# Patient Record
Sex: FEMALE | Race: WHITE | Employment: FULL TIME | URBAN - METROPOLITAN AREA
[De-identification: names, ages, dates, MRNs, and addresses within clinical notes are randomized per-mention and may not be internally consistent; named-entity substitution may affect disease eponyms.]

---

## 2018-12-31 LAB
ABO, EXTERNAL RESULT: NORMAL
C. TRACHOMATIS, EXTERNAL RESULT: NEGATIVE
HEP B, EXTERNAL RESULT: NONREACTIVE
HIV, EXTERNAL RESULT: NONREACTIVE
N. GONORRHOEAE, EXTERNAL RESULT: NEGATIVE
RHOGAM, EXTERNAL RESULT: NORMAL
RHOGAM, EXTERNAL RESULT: POSITIVE
RPR, EXTERNAL RESULT: NEGATIVE
RUBELLA TITER, EXTERNAL RESULT: NORMAL

## 2019-05-30 ENCOUNTER — TELEPHONE (OUTPATIENT)
Dept: OBGYN CLINIC | Age: 34
End: 2019-05-30

## 2019-05-30 NOTE — TELEPHONE ENCOUNTER
Patient requests becoming a np at [facility]. Patient is new to the area, moved here from [facility].  Patient is 32 wks, CHERRY 07/21/19, lmp 10/14/18  Please notify the patient

## 2019-05-31 NOTE — TELEPHONE ENCOUNTER
Called and got VM. Left message we would like to speak w her and find out about risks and care. Advised her to call back, that we were gone this afternoon and were also seeing patients so may play phone tag.  Casi Mckeon

## 2019-06-13 ENCOUNTER — INITIAL PRENATAL (OUTPATIENT)
Dept: OBGYN CLINIC | Age: 34
End: 2019-06-13
Payer: COMMERCIAL

## 2019-06-13 VITALS
HEART RATE: 102 BPM | SYSTOLIC BLOOD PRESSURE: 132 MMHG | DIASTOLIC BLOOD PRESSURE: 89 MMHG | BODY MASS INDEX: 39.27 KG/M2 | HEIGHT: 64 IN | WEIGHT: 230 LBS

## 2019-06-13 DIAGNOSIS — O99.810 ABNORMAL GLUCOSE AFFECTING PREGNANCY: ICD-10-CM

## 2019-06-13 DIAGNOSIS — O09.90 HIGH RISK PREGNANCY, ANTEPARTUM: Primary | ICD-10-CM

## 2019-06-13 DIAGNOSIS — Z23 NEED FOR TDAP VACCINATION: ICD-10-CM

## 2019-06-13 DIAGNOSIS — B00.9 HSV-1 (HERPES SIMPLEX VIRUS 1) INFECTION: ICD-10-CM

## 2019-06-13 DIAGNOSIS — Z3A.34 34 WEEKS GESTATION OF PREGNANCY: ICD-10-CM

## 2019-06-13 DIAGNOSIS — O09.519 ADVANCED MATERNAL AGE, PRIMIGRAVIDA, ANTEPARTUM: ICD-10-CM

## 2019-06-13 PROCEDURE — 0500F INITIAL PRENATAL CARE VISIT: CPT | Performed by: ADVANCED PRACTICE MIDWIFE

## 2019-06-13 PROCEDURE — 90715 TDAP VACCINE 7 YRS/> IM: CPT | Performed by: ADVANCED PRACTICE MIDWIFE

## 2019-06-13 PROCEDURE — 90471 IMMUNIZATION ADMIN: CPT | Performed by: ADVANCED PRACTICE MIDWIFE

## 2019-06-13 NOTE — PROGRESS NOTES
INITIAL OBSTETRICAL VISIT EVALUATION:    Subjective:      Michael Infante is being seen today for her new obstetrical visit. The pregnancy is  a planned pregnancy. She is  accepting at this time. Her last period was Patient's last menstrual period was 10/14/2018. .  This was a sure and definite menses. She was not on OCP at conception. Patient reports no complaints. She denies  spotting, cramping or pain  Fetal Movement: normal.     SO/Partner:  [place]  Involved:  yes    Occupation:  Stay homes     Work/Environment hazards:  no  Pets:  no    Teratogen exposure since LMP: (OTC/prescription/ETOH/drugs):  zoloft 150mg    History of prior GBS-infected child:  no  Recent travel outside of country (partner also):  yes  Where:  [place] exposure (partner also): no    Arrives as 34 week transfer of care. Reports pregnancy has been uncomplicated. Looking for low intervention birth. Objective:        /89   Pulse 102   Ht 5' 4\" (1.626 m)   Wt 230 lb (104.3 kg)   LMP 10/14/2018     See OB physical as charted     Estimated gestational age is  Unknown    Mother's ethnicity:   Father's ethnicity:     BREECH by Kwadwo Billings (mother reports being ER PA and scanning at work and baby has been breech x1 month)        Assessment:      Pregnancy @ 29 and 4/7 weeks   Advanced Maternal Age  High risk pregnancy  Breech presentation      Plan:   The patient was seen and a full history was reviewed and completed. List was initiated. Role of ultrasound in pregnancy discussed; fetal survey: results reviewed    She was counseled on office policies. She was educated about the anticipated course of prenatal care. The patient was counseled on drug screening, HIV, Cystic Fibrosis, SMA and Sickle Cell disease, as appropriate. She verbally consented to HIV testing and drug screening. She declined/accepted CF/SMA testing. She was counseled on Toxoplasmosis/Listeriosis (cats/raw meat).     Pt is not a

## 2019-06-13 NOTE — PROGRESS NOTES
After obtaining consent, and per orders of YUKI [facility], JEOVANY, injection of tdap given in Left deltoid by Belkis Riggs. Patient instructed to remain in clinic for 20 minutes afterwards, and to report any adverse reaction to me immediately.

## 2019-06-21 ENCOUNTER — HOSPITAL ENCOUNTER (OUTPATIENT)
Age: 34
Discharge: HOME | End: 2019-06-21
Attending: OBSTETRICS & GYNECOLOGY | Admitting: OBSTETRICS & GYNECOLOGY
Payer: COMMERCIAL

## 2019-06-21 ENCOUNTER — ROUTINE PRENATAL (OUTPATIENT)
Dept: OBGYN CLINIC | Age: 34
End: 2019-06-21

## 2019-06-21 VITALS
WEIGHT: 230.2 LBS | DIASTOLIC BLOOD PRESSURE: 96 MMHG | HEART RATE: 82 BPM | BODY MASS INDEX: 39.51 KG/M2 | SYSTOLIC BLOOD PRESSURE: 145 MMHG

## 2019-06-21 VITALS
RESPIRATION RATE: 18 BRPM | BODY MASS INDEX: 39.27 KG/M2 | WEIGHT: 230 LBS | SYSTOLIC BLOOD PRESSURE: 140 MMHG | HEART RATE: 92 BPM | TEMPERATURE: 96.4 F | DIASTOLIC BLOOD PRESSURE: 86 MMHG | HEIGHT: 64 IN

## 2019-06-21 DIAGNOSIS — Z3A.35 35 WEEKS GESTATION OF PREGNANCY: ICD-10-CM

## 2019-06-21 DIAGNOSIS — O09.90 HIGH RISK PREGNANCY, ANTEPARTUM: Primary | ICD-10-CM

## 2019-06-21 DIAGNOSIS — B00.9 HSV-1 (HERPES SIMPLEX VIRUS 1) INFECTION: Primary | ICD-10-CM

## 2019-06-21 DIAGNOSIS — O16.3 HYPERTENSION AFFECTING PREGNANCY IN THIRD TRIMESTER: ICD-10-CM

## 2019-06-21 PROBLEM — Z79.899 MEDICATION DOSE CHANGED: Status: ACTIVE | Noted: 2019-06-21

## 2019-06-21 PROBLEM — O14.90 PREECLAMPSIA: Status: ACTIVE | Noted: 2019-06-21

## 2019-06-21 PROBLEM — O43.109 PLACENTAL ABNORMALITY, ANTEPARTUM: Status: ACTIVE | Noted: 2019-06-21

## 2019-06-21 LAB
ABSOLUTE EOS #: 0.16 K/UL (ref 0–0.44)
ABSOLUTE IMMATURE GRANULOCYTE: 0.03 K/UL (ref 0–0.3)
ABSOLUTE LYMPH #: 1.59 K/UL (ref 1.1–3.7)
ABSOLUTE MONO #: 0.73 K/UL (ref 0.1–1.2)
ALBUMIN SERPL-MCNC: 3.2 G/DL (ref 3.5–5.2)
ALBUMIN/GLOBULIN RATIO: 1.1 (ref 1–2.5)
ALP BLD-CCNC: 125 U/L (ref 35–104)
ALT SERPL-CCNC: 14 U/L (ref 5–33)
ANION GAP SERPL CALCULATED.3IONS-SCNC: 14 MMOL/L (ref 9–17)
AST SERPL-CCNC: 21 U/L
BASOPHILS # BLD: 0 % (ref 0–2)
BASOPHILS ABSOLUTE: 0.04 K/UL (ref 0–0.2)
BILIRUB SERPL-MCNC: 0.16 MG/DL (ref 0.3–1.2)
BUN BLDV-MCNC: 8 MG/DL (ref 6–20)
BUN/CREAT BLD: ABNORMAL (ref 9–20)
CALCIUM SERPL-MCNC: 8.8 MG/DL (ref 8.6–10.4)
CHLORIDE BLD-SCNC: 102 MMOL/L (ref 98–107)
CO2: 19 MMOL/L (ref 20–31)
CREAT SERPL-MCNC: 0.44 MG/DL (ref 0.5–0.9)
CREATININE URINE: 185 MG/DL (ref 28–217)
DIFFERENTIAL TYPE: ABNORMAL
EOSINOPHILS RELATIVE PERCENT: 2 % (ref 1–4)
GFR AFRICAN AMERICAN: >60 ML/MIN
GFR NON-AFRICAN AMERICAN: >60 ML/MIN
GFR SERPL CREATININE-BSD FRML MDRD: ABNORMAL ML/MIN/{1.73_M2}
GFR SERPL CREATININE-BSD FRML MDRD: ABNORMAL ML/MIN/{1.73_M2}
GLUCOSE BLD-MCNC: 97 MG/DL (ref 70–99)
HCT VFR BLD CALC: 36.4 % (ref 36.3–47.1)
HEMOGLOBIN: 11.8 G/DL (ref 11.9–15.1)
IMMATURE GRANULOCYTES: 0 %
LACTATE DEHYDROGENASE: 135 U/L (ref 135–214)
LYMPHOCYTES # BLD: 16 % (ref 24–43)
MCH RBC QN AUTO: 29 PG (ref 25.2–33.5)
MCHC RBC AUTO-ENTMCNC: 32.4 G/DL (ref 28.4–34.8)
MCV RBC AUTO: 89.4 FL (ref 82.6–102.9)
MONOCYTES # BLD: 7 % (ref 3–12)
NRBC AUTOMATED: 0 PER 100 WBC
PDW BLD-RTO: 12.5 % (ref 11.8–14.4)
PLATELET # BLD: 239 K/UL (ref 138–453)
PLATELET ESTIMATE: ABNORMAL
PMV BLD AUTO: 10.7 FL (ref 8.1–13.5)
POTASSIUM SERPL-SCNC: 4.3 MMOL/L (ref 3.7–5.3)
RBC # BLD: 4.07 M/UL (ref 3.95–5.11)
RBC # BLD: ABNORMAL 10*6/UL
SEG NEUTROPHILS: 75 % (ref 36–65)
SEGMENTED NEUTROPHILS ABSOLUTE COUNT: 7.29 K/UL (ref 1.5–8.1)
SODIUM BLD-SCNC: 135 MMOL/L (ref 135–144)
TOTAL PROTEIN, URINE: 167 MG/DL
TOTAL PROTEIN: 6.2 G/DL (ref 6.4–8.3)
URIC ACID: 4.8 MG/DL (ref 2.4–5.7)
URINE TOTAL PROTEIN CREATININE RATIO: 0.9 (ref 0–0.2)
WBC # BLD: 9.8 K/UL (ref 3.5–11.3)
WBC # BLD: ABNORMAL 10*3/UL

## 2019-06-21 PROCEDURE — 80053 COMPREHEN METABOLIC PANEL: CPT

## 2019-06-21 PROCEDURE — 99234 HOSP IP/OBS SM DT SF/LOW 45: CPT | Performed by: ADVANCED PRACTICE MIDWIFE

## 2019-06-21 PROCEDURE — 87081 CULTURE SCREEN ONLY: CPT

## 2019-06-21 PROCEDURE — 85025 COMPLETE CBC W/AUTO DIFF WBC: CPT

## 2019-06-21 PROCEDURE — 82570 ASSAY OF URINE CREATININE: CPT

## 2019-06-21 PROCEDURE — 84550 ASSAY OF BLOOD/URIC ACID: CPT

## 2019-06-21 PROCEDURE — 84156 ASSAY OF PROTEIN URINE: CPT

## 2019-06-21 PROCEDURE — 0502F SUBSEQUENT PRENATAL CARE: CPT | Performed by: ADVANCED PRACTICE MIDWIFE

## 2019-06-21 PROCEDURE — 83615 LACTATE (LD) (LDH) ENZYME: CPT

## 2019-06-21 PROCEDURE — 96372 THER/PROPH/DIAG INJ SC/IM: CPT

## 2019-06-21 PROCEDURE — 76818 FETAL BIOPHYS PROFILE W/NST: CPT

## 2019-06-21 PROCEDURE — 76818 FETAL BIOPHYS PROFILE W/NST: CPT | Performed by: OBSTETRICS & GYNECOLOGY

## 2019-06-21 PROCEDURE — 6360000002 HC RX W HCPCS: Performed by: ADVANCED PRACTICE MIDWIFE

## 2019-06-21 PROCEDURE — 99213 OFFICE O/P EST LOW 20 MIN: CPT

## 2019-06-21 PROCEDURE — 86403 PARTICLE AGGLUT ANTBDY SCRN: CPT

## 2019-06-21 RX ORDER — BETAMETHASONE SODIUM PHOSPHATE AND BETAMETHASONE ACETATE 3; 3 MG/ML; MG/ML
12 INJECTION, SUSPENSION INTRA-ARTICULAR; INTRALESIONAL; INTRAMUSCULAR; SOFT TISSUE ONCE
Status: COMPLETED | OUTPATIENT
Start: 2019-06-21 | End: 2019-06-21

## 2019-06-21 RX ORDER — ACYCLOVIR 400 MG/1
400 TABLET ORAL 3 TIMES DAILY
Qty: 60 TABLET | Refills: 0 | Status: SHIPPED | OUTPATIENT
Start: 2019-06-21

## 2019-06-21 RX ADMIN — BETAMETHASONE SODIUM PHOSPHATE AND BETAMETHASONE ACETATE 12 MG: 3; 3 INJECTION, SUSPENSION INTRA-ARTICULAR; INTRALESIONAL; INTRAMUSCULAR at 12:17

## 2019-06-21 ASSESSMENT — ENCOUNTER SYMPTOMS
RESPIRATORY NEGATIVE: 1
ALLERGIC/IMMUNOLOGIC NEGATIVE: 1
EYES NEGATIVE: 1
GASTROINTESTINAL NEGATIVE: 1

## 2019-06-21 NOTE — FLOWSHEET NOTE
Pt presents from the office for proteinuria and hypertension. Denies LOF, vaginal bleeding. Reports + fetal movement. Reports feeling braxon-serna contractions. To triage 1, gowned and urine specimen obtained.

## 2019-06-21 NOTE — H&P
[address] and Delivery Triage Note   2019  10:42 AM      SUBJECTIVE:  CHIEF COMPLAINT:  Elevated Blood Pressure    HISTORY OF PRESENT ILLNESS:      Rosendo Arrieta is a 37 y.o. female   At [address] [place] [address] is a transfer patient. New to the area. She was seen in the office today and was noted to have an elevated /96. Currently she denies headache, blurred vision, RUQ pain, Nausea and vomiting. She states she did feel nauseous over the weekend but has since resolved. Denies chest pain and SOB. Feels occasional contractions that are not painful. Denies vaginal bleeding and loss of fluid. States baby is moving. She has been getting adequate PNC while in [place]. OBJECTIVE:  ESTIMATED DUE DATE:    Estimated Date of Delivery: 19  Patient's last menstrual period was 10/14/2018. C/w 20+4     PRENATAL CARE:  Complicated by:    Patient Active Problem List   Diagnosis    High risk pregnancy, antepartum    Abnormal glucose affecting pregnancy    Advanced maternal age, 1st pregnancy    HSV-1 (herpes simplex virus 1) infection    TDAP received 19         PAST MEDICAL HISTORY:     Past Medical History:   Diagnosis Date    Asthma     exercise induced    Herpes simplex virus (HSV) infection     questionable genital herpes in past, cold sores    Mental disorder     depression and anxiety       PAST OB HISTORY:  OB History    Para Term  AB Living   1 0 0 0 0 0   SAB TAB Ectopic Molar Multiple Live Births   0 0 0 0 0 0      # Outcome Date GA Lbr Magdi/2nd Weight Sex Delivery Anes PTL Lv   1 Current                PAST  SURGICAL HISTORY:   Past Surgical History:   Procedure Laterality Date    KNEE SURGERY      SHOULDER SURGERY         SOCIAL HISTORY:   reports that she has never smoked. She has never used smokeless tobacco. She reports that she drank alcohol. She reports that she does not use drugs.      MEDICATIONS:  Prior to Admission medications

## 2019-06-21 NOTE — PROGRESS NOTES
SUBJECTIVE:    Marilia Bull is here for her routine OB visit. Offers no complaints. Admits to anxiety with breech presentation. Trying all options. Denies any HAs, visual changes, malaise, etc  She reports  fetal movement. She denies  vaginal bleeding. She denies  leaking of fluid. She denies  vaginal discharge. She denies  uterine contraction activity. She denies  nausea and/or vomiting. She denies retaining fluid in her extremities. OBJECTIVE:   Last menstrual period 10/14/2018. Unengaged Breech per Leopold's    ASSESSMENT/PLAN:  IUP @ 35.5 weeks  Hypertension  Breech presentation    The problem list was reviewed and updated as needed. Marilia Bull will monitor for fetal movements daily    Discussed elevated BP with ensuing consequences. Higher risk given age. Reviewed BP from prior practice with steady rise noted. Currently asymptomatic. Verbalizes concern for  Section with breech. Questions version possibility. Yeni Berkowitz notified and patient sent to L&D     Marilia Bull was counseled regarding all of the above    Patient was seen with total faced to face time of 15 minutes. More than 50% of this visit was on counseling and education.

## 2019-06-22 ENCOUNTER — HOSPITAL ENCOUNTER (OUTPATIENT)
Dept: LABOR AND DELIVERY | Age: 34
Discharge: HOME | End: 2019-06-22
Attending: OBSTETRICS & GYNECOLOGY | Admitting: OBSTETRICS & GYNECOLOGY
Payer: COMMERCIAL

## 2019-06-22 VITALS
SYSTOLIC BLOOD PRESSURE: 140 MMHG | TEMPERATURE: 98.2 F | RESPIRATION RATE: 16 BRPM | HEART RATE: 102 BPM | DIASTOLIC BLOOD PRESSURE: 84 MMHG

## 2019-06-22 PROCEDURE — 6360000002 HC RX W HCPCS: Performed by: ADVANCED PRACTICE MIDWIFE

## 2019-06-22 PROCEDURE — 96372 THER/PROPH/DIAG INJ SC/IM: CPT

## 2019-06-22 RX ORDER — BETAMETHASONE SODIUM PHOSPHATE AND BETAMETHASONE ACETATE 3; 3 MG/ML; MG/ML
12 INJECTION, SUSPENSION INTRA-ARTICULAR; INTRALESIONAL; INTRAMUSCULAR; SOFT TISSUE ONCE
Status: COMPLETED | OUTPATIENT
Start: 2019-06-22 | End: 2019-06-22

## 2019-06-22 RX ADMIN — BETAMETHASONE SODIUM PHOSPHATE AND BETAMETHASONE ACETATE 12 MG: 3; 3 INJECTION, SUSPENSION INTRA-ARTICULAR; INTRALESIONAL; INTRAMUSCULAR at 12:21

## 2019-06-23 ENCOUNTER — APPOINTMENT (OUTPATIENT)
Dept: GENERAL RADIOLOGY | Age: 34
End: 2019-06-23
Payer: COMMERCIAL

## 2019-06-23 ENCOUNTER — ANESTHESIA EVENT (OUTPATIENT)
Dept: LABOR AND DELIVERY | Age: 34
End: 2019-06-23
Payer: COMMERCIAL

## 2019-06-23 ENCOUNTER — HOSPITAL ENCOUNTER (INPATIENT)
Age: 34
LOS: 4 days | Discharge: HOME | End: 2019-06-27
Attending: OBSTETRICS & GYNECOLOGY | Admitting: OBSTETRICS & GYNECOLOGY
Payer: COMMERCIAL

## 2019-06-23 ENCOUNTER — ANESTHESIA (OUTPATIENT)
Dept: LABOR AND DELIVERY | Age: 34
End: 2019-06-23
Payer: COMMERCIAL

## 2019-06-23 VITALS — SYSTOLIC BLOOD PRESSURE: 121 MMHG | DIASTOLIC BLOOD PRESSURE: 71 MMHG | OXYGEN SATURATION: 97 %

## 2019-06-23 PROBLEM — B95.1 POSITIVE GBS TEST: Status: ACTIVE | Noted: 2019-06-23

## 2019-06-23 PROBLEM — O14.93 PREECLAMPSIA, THIRD TRIMESTER: Status: ACTIVE | Noted: 2019-06-23

## 2019-06-23 PROBLEM — O14.13 PRE-ECLAMPSIA, SEVERE, ANTEPARTUM, THIRD TRIMESTER: Status: ACTIVE | Noted: 2019-06-23

## 2019-06-23 PROBLEM — O09.93 HIGH-RISK PREGNANCY IN THIRD TRIMESTER: Status: ACTIVE | Noted: 2019-06-23

## 2019-06-23 LAB
ABO/RH: NORMAL
ABSOLUTE EOS #: <0.03 K/UL (ref 0–0.44)
ABSOLUTE IMMATURE GRANULOCYTE: 0.2 K/UL (ref 0–0.3)
ABSOLUTE LYMPH #: 1.88 K/UL (ref 1.1–3.7)
ABSOLUTE MONO #: 1.38 K/UL (ref 0.1–1.2)
ALBUMIN SERPL-MCNC: 3.2 G/DL (ref 3.5–5.2)
ALBUMIN/GLOBULIN RATIO: 1.1 (ref 1–2.5)
ALP BLD-CCNC: 124 U/L (ref 35–104)
ALT SERPL-CCNC: 13 U/L (ref 5–33)
AMPHETAMINE SCREEN URINE: NEGATIVE
ANION GAP SERPL CALCULATED.3IONS-SCNC: 11 MMOL/L (ref 9–17)
ANTIBODY SCREEN: NEGATIVE
ARM BAND NUMBER: NORMAL
AST SERPL-CCNC: 22 U/L
BARBITURATE SCREEN URINE: NEGATIVE
BASOPHILS # BLD: 0 % (ref 0–2)
BASOPHILS ABSOLUTE: 0.03 K/UL (ref 0–0.2)
BENZODIAZEPINE SCREEN, URINE: NEGATIVE
BILIRUB SERPL-MCNC: <0.1 MG/DL (ref 0.3–1.2)
BUN BLDV-MCNC: 10 MG/DL (ref 6–20)
BUN/CREAT BLD: ABNORMAL (ref 9–20)
BUPRENORPHINE URINE: NORMAL
CALCIUM SERPL-MCNC: 9.6 MG/DL (ref 8.6–10.4)
CANNABINOID SCREEN URINE: NEGATIVE
CHLORIDE BLD-SCNC: 106 MMOL/L (ref 98–107)
CO2: 19 MMOL/L (ref 20–31)
COCAINE METABOLITE, URINE: NEGATIVE
CREAT SERPL-MCNC: 0.49 MG/DL (ref 0.5–0.9)
CREATININE URINE: 130.3 MG/DL (ref 28–217)
CULTURE: ABNORMAL
DIFFERENTIAL TYPE: ABNORMAL
EOSINOPHILS RELATIVE PERCENT: 0 % (ref 1–4)
EXPIRATION DATE: NORMAL
GFR AFRICAN AMERICAN: >60 ML/MIN
GFR NON-AFRICAN AMERICAN: >60 ML/MIN
GFR SERPL CREATININE-BSD FRML MDRD: ABNORMAL ML/MIN/{1.73_M2}
GFR SERPL CREATININE-BSD FRML MDRD: ABNORMAL ML/MIN/{1.73_M2}
GLUCOSE BLD-MCNC: 142 MG/DL (ref 70–99)
HCT VFR BLD CALC: 33.9 % (ref 36.3–47.1)
HEMOGLOBIN: 11 G/DL (ref 11.9–15.1)
IMMATURE GRANULOCYTES: 1 %
LYMPHOCYTES # BLD: 13 % (ref 24–43)
Lab: ABNORMAL
MCH RBC QN AUTO: 29.7 PG (ref 25.2–33.5)
MCHC RBC AUTO-ENTMCNC: 32.4 G/DL (ref 28.4–34.8)
MCV RBC AUTO: 91.6 FL (ref 82.6–102.9)
MDMA URINE: NORMAL
METHADONE SCREEN, URINE: NEGATIVE
METHAMPHETAMINE, URINE: NORMAL
MONOCYTES # BLD: 9 % (ref 3–12)
NRBC AUTOMATED: 0.1 PER 100 WBC
OPIATES, URINE: NEGATIVE
OXYCODONE SCREEN URINE: NEGATIVE
PDW BLD-RTO: 12.6 % (ref 11.8–14.4)
PHENCYCLIDINE, URINE: NEGATIVE
PLATELET # BLD: 221 K/UL (ref 138–453)
PLATELET ESTIMATE: ABNORMAL
PMV BLD AUTO: 10.6 FL (ref 8.1–13.5)
POTASSIUM SERPL-SCNC: 4.3 MMOL/L (ref 3.7–5.3)
PROPOXYPHENE, URINE: NORMAL
RBC # BLD: 3.7 M/UL (ref 3.95–5.11)
RBC # BLD: ABNORMAL 10*6/UL
SEG NEUTROPHILS: 77 % (ref 36–65)
SEGMENTED NEUTROPHILS ABSOLUTE COUNT: 11.51 K/UL (ref 1.5–8.1)
SODIUM BLD-SCNC: 136 MMOL/L (ref 135–144)
SPECIMEN DESCRIPTION: ABNORMAL
T. PALLIDUM, IGG: NONREACTIVE
TEST INFORMATION: NORMAL
TOTAL PROTEIN, URINE: 123 MG/DL
TOTAL PROTEIN: 6 G/DL (ref 6.4–8.3)
TRICYCLIC ANTIDEPRESSANTS, UR: NORMAL
URINE TOTAL PROTEIN CREATININE RATIO: 0.94 (ref 0–0.2)
WBC # BLD: 15 K/UL (ref 3.5–11.3)
WBC # BLD: ABNORMAL 10*3/UL

## 2019-06-23 PROCEDURE — 2580000003 HC RX 258: Performed by: ADVANCED PRACTICE MIDWIFE

## 2019-06-23 PROCEDURE — 1220000000 HC SEMI PRIVATE OB R&B

## 2019-06-23 PROCEDURE — 80053 COMPREHEN METABOLIC PANEL: CPT

## 2019-06-23 PROCEDURE — 2580000003 HC RX 258: Performed by: NURSE ANESTHETIST, CERTIFIED REGISTERED

## 2019-06-23 PROCEDURE — 86900 BLOOD TYPING SEROLOGIC ABO: CPT

## 2019-06-23 PROCEDURE — 82570 ASSAY OF URINE CREATININE: CPT

## 2019-06-23 PROCEDURE — 6360000002 HC RX W HCPCS: Performed by: ADVANCED PRACTICE MIDWIFE

## 2019-06-23 PROCEDURE — 6360000002 HC RX W HCPCS: Performed by: STUDENT IN AN ORGANIZED HEALTH CARE EDUCATION/TRAINING PROGRAM

## 2019-06-23 PROCEDURE — 6370000000 HC RX 637 (ALT 250 FOR IP): Performed by: STUDENT IN AN ORGANIZED HEALTH CARE EDUCATION/TRAINING PROGRAM

## 2019-06-23 PROCEDURE — 3609079900 HC CESAREAN SECTION: Performed by: OBSTETRICS & GYNECOLOGY

## 2019-06-23 PROCEDURE — 86780 TREPONEMA PALLIDUM: CPT

## 2019-06-23 PROCEDURE — 2500000003 HC RX 250 WO HCPCS: Performed by: NURSE ANESTHETIST, CERTIFIED REGISTERED

## 2019-06-23 PROCEDURE — 2580000003 HC RX 258: Performed by: STUDENT IN AN ORGANIZED HEALTH CARE EDUCATION/TRAINING PROGRAM

## 2019-06-23 PROCEDURE — 7100000000 HC PACU RECOVERY - FIRST 15 MIN: Performed by: OBSTETRICS & GYNECOLOGY

## 2019-06-23 PROCEDURE — 84156 ASSAY OF PROTEIN URINE: CPT

## 2019-06-23 PROCEDURE — 3700000001 HC ADD 15 MINUTES (ANESTHESIA): Performed by: OBSTETRICS & GYNECOLOGY

## 2019-06-23 PROCEDURE — 85025 COMPLETE CBC W/AUTO DIFF WBC: CPT

## 2019-06-23 PROCEDURE — 3700000000 HC ANESTHESIA ATTENDED CARE: Performed by: OBSTETRICS & GYNECOLOGY

## 2019-06-23 PROCEDURE — 96365 THER/PROPH/DIAG IV INF INIT: CPT

## 2019-06-23 PROCEDURE — 2709999900 HC NON-CHARGEABLE SUPPLY: Performed by: OBSTETRICS & GYNECOLOGY

## 2019-06-23 PROCEDURE — 86901 BLOOD TYPING SEROLOGIC RH(D): CPT

## 2019-06-23 PROCEDURE — 96366 THER/PROPH/DIAG IV INF ADDON: CPT

## 2019-06-23 PROCEDURE — 80307 DRUG TEST PRSMV CHEM ANLYZR: CPT

## 2019-06-23 PROCEDURE — 6360000002 HC RX W HCPCS: Performed by: NURSE ANESTHETIST, CERTIFIED REGISTERED

## 2019-06-23 PROCEDURE — 86850 RBC ANTIBODY SCREEN: CPT

## 2019-06-23 PROCEDURE — 88307 TISSUE EXAM BY PATHOLOGIST: CPT

## 2019-06-23 PROCEDURE — 7100000001 HC PACU RECOVERY - ADDTL 15 MIN: Performed by: OBSTETRICS & GYNECOLOGY

## 2019-06-23 RX ORDER — ACETAMINOPHEN 500 MG
1000 TABLET ORAL ONCE
Status: DISCONTINUED | OUTPATIENT
Start: 2019-06-23 | End: 2019-06-23

## 2019-06-23 RX ORDER — ONDANSETRON 2 MG/ML
4 INJECTION INTRAMUSCULAR; INTRAVENOUS EVERY 6 HOURS PRN
Status: DISCONTINUED | OUTPATIENT
Start: 2019-06-23 | End: 2019-06-27 | Stop reason: HOSPADM

## 2019-06-23 RX ORDER — NALOXONE HYDROCHLORIDE 0.4 MG/ML
0.4 INJECTION, SOLUTION INTRAMUSCULAR; INTRAVENOUS; SUBCUTANEOUS PRN
Status: DISCONTINUED | OUTPATIENT
Start: 2019-06-23 | End: 2019-06-27 | Stop reason: HOSPADM

## 2019-06-23 RX ORDER — NALOXONE HYDROCHLORIDE 0.4 MG/ML
0.4 INJECTION, SOLUTION INTRAMUSCULAR; INTRAVENOUS; SUBCUTANEOUS PRN
Status: DISCONTINUED | OUTPATIENT
Start: 2019-06-23 | End: 2019-06-23

## 2019-06-23 RX ORDER — DOCUSATE SODIUM 100 MG/1
100 CAPSULE, LIQUID FILLED ORAL 2 TIMES DAILY
Status: DISCONTINUED | OUTPATIENT
Start: 2019-06-23 | End: 2019-06-27 | Stop reason: HOSPADM

## 2019-06-23 RX ORDER — OXYCODONE HYDROCHLORIDE AND ACETAMINOPHEN 5; 325 MG/1; MG/1
1 TABLET ORAL EVERY 4 HOURS PRN
Status: DISCONTINUED | OUTPATIENT
Start: 2019-06-24 | End: 2019-06-27 | Stop reason: HOSPADM

## 2019-06-23 RX ORDER — MAGNESIUM SULFATE IN WATER 40 MG/ML
4 INJECTION, SOLUTION INTRAVENOUS ONCE
Status: COMPLETED | OUTPATIENT
Start: 2019-06-23 | End: 2019-06-23

## 2019-06-23 RX ORDER — DIPHENHYDRAMINE HYDROCHLORIDE 50 MG/ML
25 INJECTION INTRAMUSCULAR; INTRAVENOUS EVERY 6 HOURS PRN
Status: DISCONTINUED | OUTPATIENT
Start: 2019-06-23 | End: 2019-06-27 | Stop reason: HOSPADM

## 2019-06-23 RX ORDER — POLYETHYLENE GLYCOL 3350 17 G/17G
17 POWDER, FOR SOLUTION ORAL DAILY PRN
Status: DISCONTINUED | OUTPATIENT
Start: 2019-06-23 | End: 2019-06-27 | Stop reason: HOSPADM

## 2019-06-23 RX ORDER — LANOLIN 100 %
OINTMENT (GRAM) TOPICAL
Status: DISCONTINUED | OUTPATIENT
Start: 2019-06-23 | End: 2019-06-27 | Stop reason: HOSPADM

## 2019-06-23 RX ORDER — ONDANSETRON 2 MG/ML
4 INJECTION INTRAMUSCULAR; INTRAVENOUS EVERY 6 HOURS PRN
Status: DISCONTINUED | OUTPATIENT
Start: 2019-06-23 | End: 2019-06-23

## 2019-06-23 RX ORDER — TRISODIUM CITRATE DIHYDRATE AND CITRIC ACID MONOHYDRATE 500; 334 MG/5ML; MG/5ML
30 SOLUTION ORAL ONCE
Status: COMPLETED | OUTPATIENT
Start: 2019-06-23 | End: 2019-06-23

## 2019-06-23 RX ORDER — KETOROLAC TROMETHAMINE 30 MG/ML
INJECTION, SOLUTION INTRAMUSCULAR; INTRAVENOUS PRN
Status: DISCONTINUED | OUTPATIENT
Start: 2019-06-23 | End: 2019-06-23 | Stop reason: SDUPTHER

## 2019-06-23 RX ORDER — SODIUM CHLORIDE, SODIUM LACTATE, POTASSIUM CHLORIDE, CALCIUM CHLORIDE 600; 310; 30; 20 MG/100ML; MG/100ML; MG/100ML; MG/100ML
INJECTION, SOLUTION INTRAVENOUS CONTINUOUS
Status: DISCONTINUED | OUTPATIENT
Start: 2019-06-23 | End: 2019-06-24

## 2019-06-23 RX ORDER — SIMETHICONE 80 MG
80 TABLET,CHEWABLE ORAL EVERY 6 HOURS PRN
Status: DISCONTINUED | OUTPATIENT
Start: 2019-06-23 | End: 2019-06-27 | Stop reason: HOSPADM

## 2019-06-23 RX ORDER — BUPIVACAINE HYDROCHLORIDE 7.5 MG/ML
INJECTION, SOLUTION INTRASPINAL PRN
Status: DISCONTINUED | OUTPATIENT
Start: 2019-06-23 | End: 2019-06-23 | Stop reason: SDUPTHER

## 2019-06-23 RX ORDER — SODIUM CHLORIDE, SODIUM LACTATE, POTASSIUM CHLORIDE, CALCIUM CHLORIDE 600; 310; 30; 20 MG/100ML; MG/100ML; MG/100ML; MG/100ML
INJECTION, SOLUTION INTRAVENOUS CONTINUOUS PRN
Status: DISCONTINUED | OUTPATIENT
Start: 2019-06-23 | End: 2019-06-23 | Stop reason: SDUPTHER

## 2019-06-23 RX ORDER — ACETAMINOPHEN 500 MG
1000 TABLET ORAL EVERY 6 HOURS PRN
Status: DISCONTINUED | OUTPATIENT
Start: 2019-06-24 | End: 2019-06-23

## 2019-06-23 RX ORDER — CEPHALEXIN 500 MG/1
500 CAPSULE ORAL EVERY 8 HOURS SCHEDULED
Status: COMPLETED | OUTPATIENT
Start: 2019-06-24 | End: 2019-06-26

## 2019-06-23 RX ORDER — KETOROLAC TROMETHAMINE 30 MG/ML
30 INJECTION, SOLUTION INTRAMUSCULAR; INTRAVENOUS EVERY 6 HOURS
Status: COMPLETED | OUTPATIENT
Start: 2019-06-24 | End: 2019-06-24

## 2019-06-23 RX ORDER — NALBUPHINE HCL 10 MG/ML
5 AMPUL (ML) INJECTION EVERY 4 HOURS PRN
Status: DISCONTINUED | OUTPATIENT
Start: 2019-06-23 | End: 2019-06-23

## 2019-06-23 RX ORDER — SODIUM CHLORIDE, SODIUM LACTATE, POTASSIUM CHLORIDE, CALCIUM CHLORIDE 600; 310; 30; 20 MG/100ML; MG/100ML; MG/100ML; MG/100ML
INJECTION, SOLUTION INTRAVENOUS ONCE
Status: COMPLETED | OUTPATIENT
Start: 2019-06-23 | End: 2019-06-23

## 2019-06-23 RX ORDER — MORPHINE SULFATE 1 MG/ML
INJECTION, SOLUTION EPIDURAL; INTRATHECAL; INTRAVENOUS PRN
Status: DISCONTINUED | OUTPATIENT
Start: 2019-06-23 | End: 2019-06-23 | Stop reason: SDUPTHER

## 2019-06-23 RX ORDER — SODIUM CHLORIDE 0.9 % (FLUSH) 0.9 %
10 SYRINGE (ML) INJECTION PRN
Status: DISCONTINUED | OUTPATIENT
Start: 2019-06-23 | End: 2019-06-27 | Stop reason: HOSPADM

## 2019-06-23 RX ORDER — BISACODYL 10 MG
10 SUPPOSITORY, RECTAL RECTAL DAILY PRN
Status: DISCONTINUED | OUTPATIENT
Start: 2019-06-23 | End: 2019-06-27 | Stop reason: HOSPADM

## 2019-06-23 RX ORDER — ACETAMINOPHEN 500 MG
1000 TABLET ORAL EVERY 6 HOURS PRN
Status: DISCONTINUED | OUTPATIENT
Start: 2019-06-23 | End: 2019-06-27 | Stop reason: HOSPADM

## 2019-06-23 RX ORDER — PRENATAL WITH FERROUS FUM AND FOLIC ACID 3080; 920; 120; 400; 22; 1.84; 3; 20; 10; 1; 12; 200; 27; 25; 2 [IU]/1; [IU]/1; MG/1; [IU]/1; MG/1; MG/1; MG/1; MG/1; MG/1; MG/1; UG/1; MG/1; MG/1; MG/1; MG/1
1 TABLET ORAL DAILY
Status: DISCONTINUED | OUTPATIENT
Start: 2019-06-23 | End: 2019-06-27 | Stop reason: HOSPADM

## 2019-06-23 RX ORDER — IBUPROFEN 800 MG/1
800 TABLET ORAL EVERY 8 HOURS PRN
Status: DISCONTINUED | OUTPATIENT
Start: 2019-06-24 | End: 2019-06-27 | Stop reason: HOSPADM

## 2019-06-23 RX ORDER — OXYCODONE HYDROCHLORIDE AND ACETAMINOPHEN 5; 325 MG/1; MG/1
2 TABLET ORAL EVERY 4 HOURS PRN
Status: DISCONTINUED | OUTPATIENT
Start: 2019-06-24 | End: 2019-06-27 | Stop reason: HOSPADM

## 2019-06-23 RX ADMIN — LEVONORGESTREL 1 EACH: 52 INTRAUTERINE DEVICE INTRAUTERINE at 19:05

## 2019-06-23 RX ADMIN — SODIUM CHLORIDE, POTASSIUM CHLORIDE, SODIUM LACTATE AND CALCIUM CHLORIDE: 600; 310; 30; 20 INJECTION, SOLUTION INTRAVENOUS at 20:07

## 2019-06-23 RX ADMIN — MAGNESIUM SULFATE HEPTAHYDRATE 2 G/HR: 40 INJECTION, SOLUTION INTRAVENOUS at 16:52

## 2019-06-23 RX ADMIN — Medication 1 MILLI-UNITS/MIN: at 20:15

## 2019-06-23 RX ADMIN — AZITHROMYCIN MONOHYDRATE 500 MG: 500 INJECTION, POWDER, LYOPHILIZED, FOR SOLUTION INTRAVENOUS at 17:32

## 2019-06-23 RX ADMIN — PHENYLEPHRINE HYDROCHLORIDE 200 MCG: 10 INJECTION INTRAVENOUS at 18:50

## 2019-06-23 RX ADMIN — SODIUM CHLORIDE, POTASSIUM CHLORIDE, SODIUM LACTATE AND CALCIUM CHLORIDE: 600; 310; 30; 20 INJECTION, SOLUTION INTRAVENOUS at 20:15

## 2019-06-23 RX ADMIN — DOCUSATE SODIUM 100 MG: 100 CAPSULE, LIQUID FILLED ORAL at 21:18

## 2019-06-23 RX ADMIN — ONDANSETRON 4 MG: 2 INJECTION INTRAMUSCULAR; INTRAVENOUS at 19:08

## 2019-06-23 RX ADMIN — PHENYLEPHRINE HYDROCHLORIDE 200 MCG: 10 INJECTION INTRAVENOUS at 18:45

## 2019-06-23 RX ADMIN — Medication 100 ML/HR: at 18:57

## 2019-06-23 RX ADMIN — SODIUM CHLORIDE, POTASSIUM CHLORIDE, SODIUM LACTATE AND CALCIUM CHLORIDE: 600; 310; 30; 20 INJECTION, SOLUTION INTRAVENOUS at 15:50

## 2019-06-23 RX ADMIN — ACETAMINOPHEN 1000 MG: 500 TABLET ORAL at 21:03

## 2019-06-23 RX ADMIN — PHENYLEPHRINE HYDROCHLORIDE 200 MCG: 10 INJECTION INTRAVENOUS at 18:32

## 2019-06-23 RX ADMIN — Medication 2 G: at 18:15

## 2019-06-23 RX ADMIN — SODIUM CITRATE AND CITRIC ACID MONOHYDRATE 30 ML: 500; 334 SOLUTION ORAL at 18:16

## 2019-06-23 RX ADMIN — ONDANSETRON 4 MG: 2 INJECTION INTRAMUSCULAR; INTRAVENOUS at 16:25

## 2019-06-23 RX ADMIN — MORPHINE SULFATE 0.2 MG: 1 INJECTION, SOLUTION EPIDURAL; INTRATHECAL; INTRAVENOUS at 18:30

## 2019-06-23 RX ADMIN — KETOROLAC TROMETHAMINE 30 MG: 30 INJECTION, SOLUTION INTRAMUSCULAR at 19:08

## 2019-06-23 RX ADMIN — PHENYLEPHRINE HYDROCHLORIDE 100 MCG: 10 INJECTION INTRAVENOUS at 18:36

## 2019-06-23 RX ADMIN — PHENYLEPHRINE HYDROCHLORIDE 200 MCG: 10 INJECTION INTRAVENOUS at 19:03

## 2019-06-23 RX ADMIN — BUPIVACAINE HYDROCHLORIDE IN DEXTROSE 2 ML: 7.5 INJECTION, SOLUTION SUBARACHNOID at 18:30

## 2019-06-23 RX ADMIN — MAGNESIUM SULFATE IN WATER 4 G: 40 INJECTION, SOLUTION INTRAVENOUS at 16:27

## 2019-06-23 RX ADMIN — SODIUM CHLORIDE, POTASSIUM CHLORIDE, SODIUM LACTATE AND CALCIUM CHLORIDE: 600; 310; 30; 20 INJECTION, SOLUTION INTRAVENOUS at 18:26

## 2019-06-23 ASSESSMENT — PULMONARY FUNCTION TESTS
PIF_VALUE: 0
PIF_VALUE: 1
PIF_VALUE: 0

## 2019-06-23 ASSESSMENT — PAIN DESCRIPTION - LOCATION: LOCATION: HEAD

## 2019-06-23 ASSESSMENT — PAIN SCALES - GENERAL
PAINLEVEL_OUTOF10: 3
PAINLEVEL_OUTOF10: 3
PAINLEVEL_OUTOF10: 2

## 2019-06-23 ASSESSMENT — PAIN DESCRIPTION - PAIN TYPE: TYPE: ACUTE PAIN

## 2019-06-23 NOTE — ANESTHESIA PROCEDURE NOTES
Spinal Block    Patient location during procedure: OR  Start time: 6/23/2019 6:36 PM  Reason for block: primary anesthetic and at surgeon's request  Staffing  Resident/CRNA: NICOLAS Dawson CRNA  Performed: resident/CRNA   Preanesthetic Checklist  Completed: patient identified, site marked, surgical consent, pre-op evaluation, timeout performed, IV checked, risks and benefits discussed, monitors and equipment checked, anesthesia consent given, oxygen available and patient being monitored  Spinal Block  Patient position: sitting  Prep: Betadine  Patient monitoring: frequent blood pressure checks and continuous pulse ox  Approach: midline  Location: L3/L4  Provider prep: sterile gloves and mask  Local infiltration: lidocaine  Dose: 0.2  Agent: bupivacaine  Adjuvant: duramorph  Dose: 2  Dose: 2  Needle  Needle type: Pencan   Needle gauge: 26 G  Needle length: 4 in  Assessment  Sensory level: T4  Swirl obtained: Yes  CSF: clear  Attempts: 1  Hemodynamics: stable

## 2019-06-23 NOTE — ANESTHESIA PRE PROCEDURE
(herpes simplex virus 1) infection B00.9    TDAP received 6/13/19 Z23    Preeclampsia WITH SF O14.90    Celestone 6/21, 6/22 Z79.899    Placental abnormality, antepartum O43.109    High-risk pregnancy in third trimester O09.93    Positive GBS test B95.1    Pre-eclampsia, severe, antepartum, third trimester O14.13    Breech presentation O32. 1XX0    39 weeks gestation of pregnancy Z3A.36       Past Medical History:        Diagnosis Date    Asthma     exercise induced    Herpes simplex virus (HSV) infection     questionable genital herpes in past, cold sores    Mental disorder     depression and anxiety       Past Surgical History:        Procedure Laterality Date    KNEE SURGERY  2010    SHOULDER SURGERY  2012       Social History:    Social History     Tobacco Use    Smoking status: Never Smoker    Smokeless tobacco: Never Used   Substance Use Topics    Alcohol use: Not Currently                                Counseling given: Not Answered      Vital Signs (Current):   Vitals:    06/23/19 1645 06/23/19 1650 06/23/19 1700 06/23/19 1730   BP: (!) 143/74 (!) 136/97 (!) 140/68 (!) 141/76   Pulse: 92 96 89 87   Resp:   16 16   Temp:       TempSrc:       SpO2:   95%    Weight:       Height:                                                  BP Readings from Last 3 Encounters:   06/23/19 (!) 141/76   06/22/19 (!) 140/84   06/21/19 (!) 140/86       NPO Status:                                                                                 BMI:   Wt Readings from Last 3 Encounters:   06/23/19 230 lb (104.3 kg)   06/21/19 230 lb (104.3 kg)   06/21/19 230 lb 3.2 oz (104.4 kg)     Body mass index is 39.48 kg/m².     CBC:   Lab Results   Component Value Date    WBC 15.0 06/23/2019    RBC 3.70 06/23/2019    HGB 11.0 06/23/2019    HCT 33.9 06/23/2019    MCV 91.6 06/23/2019    RDW 12.6 06/23/2019     06/23/2019       CMP:   Lab Results   Component Value Date     06/23/2019    K 4.3 06/23/2019     06/23/2019    CO2 19 06/23/2019    BUN 10 06/23/2019    CREATININE 0.49 06/23/2019    GFRAA >60 06/23/2019    LABGLOM >60 06/23/2019    GLUCOSE 142 06/23/2019    PROT 6.0 06/23/2019    CALCIUM 9.6 06/23/2019    BILITOT <0.10 06/23/2019    ALKPHOS 124 06/23/2019    AST 22 06/23/2019    ALT 13 06/23/2019       POC Tests: No results for input(s): POCGLU, POCNA, POCK, POCCL, POCBUN, POCHEMO, POCHCT in the last 72 hours. Coags: No results found for: PROTIME, INR, APTT    HCG (If Applicable): No results found for: PREGTESTUR, PREGSERUM, HCG, HCGQUANT     ABGs: No results found for: PHART, PO2ART, XQC7KOT, IXZ0AOV, BEART, J0OIOXBY     Type & Screen (If Applicable):  No results found for: LABABO, [address]    Anesthesia Evaluation  Patient summary reviewed and Nursing notes reviewed  Airway: Mallampati: II  TM distance: >3 FB   Neck ROM: full  Mouth opening: > = 3 FB Dental: normal exam         Pulmonary: breath sounds clear to auscultation  (+) asthma: allergic asthma,                            Cardiovascular:  Exercise tolerance: good (>4 METS),   (+) hypertension: severe,         Rhythm: regular  Rate: normal                 ROS comment: Severe pre eclampsia     Neuro/Psych:   (+) psychiatric history: stable with treatment            GI/Hepatic/Renal:            ROS comment: LFTs are marginally elevated. Endo/Other: Negative Endo/Other ROS                    Abdominal:   (+) obese,         Vascular:                                      Anesthesia Plan      spinal and general     ASA 3     (ASA 3  Risks of spinal opiates discussed with the patient prior to OR transfer)      MIPS: Postoperative opioids intended, Prophylactic antiemetics administered and Postoperative trial extubation. Anesthetic plan and risks discussed with patient and spouse.         Attending anesthesiologist reviewed and agrees with [address], NICOLAS - CRNA   6/23/2019

## 2019-06-23 NOTE — PROGRESS NOTES
OB/GYN MMW Resident Involvement Note     Date: 2019       Time: 5:03 PM   Patient Name: Kisohre Shrestha     Patient : 1985  Room/Bed: [place] [address]    Admission Date/Time: 2019  2:45 PM      HPI: Kishore Shrestha is a 37 y.o.  at 36w0d who presents with headache that is refractory to tylenol. The patient reports fetal movement is present, denies contractions, denies loss of fluid, denies vaginal bleeding. Patient denies vision changes, nausea, vomiting, fever, chills, shortness of breath, chest pain, RUQ pain, abdominal pain, diarrhea, change in color/amount/odor of vaginal discharge, dysuria or, hematuria. DATING:  LMP: Patient's last menstrual period was 10/14/2018.   Estimated Date of Delivery: 19   Based on: LMP    PREGNANCY RISK FACTORS:  Patient Active Problem List   Diagnosis    High risk pregnancy, antepartum    Abn 1hr, normal 2hr GTT    Advanced maternal age, 1st pregnancy    HSV-1 (herpes simplex virus 1) infection    TDAP received 19    Preeclampsia WITH SF    Celestone ,     Placental abnormality, antepartum    High-risk pregnancy in third trimester    Positive GBS test    Preeclampsia, third trimester        Steroids Given In This Pregnancy:  no     REVIEW OF SYSTEMS:  Constitutional: negative fever, negative chills  HEENT: negative visual disturbances, positive headaches  Respiratory: negative dyspnea, negative cough  Cardiovascular: negative chest pain,  negative palpitations  Gastrointestinal: negative abdominal pain, negative RUQ pain, negative N/V, negative diarrhea, negative constipation  Genitourinary: negative dysuria, negative vaginal discharge  Dermatological: negative rash  Hematologic: negative bruising  Immunologic/Lymphatic: negative recent illness, negative recent sick contact  Musculoskeletal: negative back pain  Neurological:  negative dizziness, negative weakness  Behavior/Psych: negative depression, negative rhythm and no murmur    Abdomen:  soft, gravid, non-tender, no right upper quadrant tenderness, no CVA tenderness, uterus non-tender, no signs of abruption and no signs of chorioamnionitis  Extremities:  no calf tenderness, non edematous, DTRs: normal    Pelvic Exam:   Sterile Speculum Exam: not indicated  Rectal Exam: not indicated       LIMITED BEDSIDE US:  Position: Breech  Placental Location: posterior  Fetal Heart Tones: Present  Fetal Movement: Present  Amniotic Fluid Index/Volume: Greater than 2x2 cm vertical pocket  Estimated Fetal Weight:  6 lbs 7oz      ASSESSMENT & PLAN:  René Kenyon is a 37 y.o. female  at 44w0d who presents with HA refractory to tylenol   - GBS positive / Rh positive / R immune   - No indication for GBS prophylaxis   - ATSO Dr. Corrinne Griffith   - CBC, TPall, T&S   - UDS R/B/A discussed, consent obtained and in chart   -  with Mirena IUD placement R/B/A discussed, consent obtained and in chart   - BSUS/Leopolds: Breech with EFW 6#7   - Will obtain PreE labs at this time     PreE w/o SFs   - Newly diagnosed on 19 with PreE labs WNL x 1, P/C 0.9    Breech presentation   - Confirmed BSUS at this time    Accessory lobe of placenta    Elevated 1hr GTT, normal 2hr GTT    Late txfr care from [place]   - records in media    Hx of bulimia    Patient Active Problem List    Diagnosis Date Noted    Positive GBS test 2019     Priority: High    High-risk pregnancy in third trimester 2019    Preeclampsia, third trimester 2019    Preeclampsia WITH SF 2019    Celestone , 2019    Placental abnormality, antepartum 2019     Accessory Lobe      High risk pregnancy, antepartum 2019     Transfer of care  Placenta with accessory lobe noted on anatomy  U/c negative at intake and UDS negative at intake  Pap done and WNL negative HPV on intake  AFP drawn but unable to be resulted d/t hemolyzed         Abn 1hr, normal 2hr GTT 2019     1 hr

## 2019-06-24 LAB
ABSOLUTE EOS #: 0 K/UL (ref 0–0.4)
ABSOLUTE IMMATURE GRANULOCYTE: 0 K/UL (ref 0–0.3)
ABSOLUTE LYMPH #: 3.14 K/UL (ref 1–4.8)
ABSOLUTE MONO #: 1.96 K/UL (ref 0.1–0.8)
BASOPHILS # BLD: 1 % (ref 0–2)
BASOPHILS ABSOLUTE: 0.2 K/UL (ref 0–0.2)
DIFFERENTIAL TYPE: ABNORMAL
EOSINOPHILS RELATIVE PERCENT: 0 % (ref 1–4)
HCT VFR BLD CALC: 31 % (ref 36.3–47.1)
HEMOGLOBIN: 9.8 G/DL (ref 11.9–15.1)
IMMATURE GRANULOCYTES: 0 %
LYMPHOCYTES # BLD: 16 % (ref 24–44)
MAGNESIUM: 4.8 MG/DL (ref 1.6–2.6)
MAGNESIUM: 5.3 MG/DL (ref 1.6–2.6)
MAGNESIUM: 5.6 MG/DL (ref 1.6–2.6)
MAGNESIUM: 5.8 MG/DL (ref 1.6–2.6)
MCH RBC QN AUTO: 29.4 PG (ref 25.2–33.5)
MCHC RBC AUTO-ENTMCNC: 31.6 G/DL (ref 28.4–34.8)
MCV RBC AUTO: 93.1 FL (ref 82.6–102.9)
MONOCYTES # BLD: 10 % (ref 1–7)
MORPHOLOGY: NORMAL
NRBC AUTOMATED: 0 PER 100 WBC
PDW BLD-RTO: 12.7 % (ref 11.8–14.4)
PLATELET # BLD: 211 K/UL (ref 138–453)
PLATELET ESTIMATE: ABNORMAL
PMV BLD AUTO: 11.1 FL (ref 8.1–13.5)
RBC # BLD: 3.33 M/UL (ref 3.95–5.11)
RBC # BLD: ABNORMAL 10*6/UL
SEG NEUTROPHILS: 73 % (ref 36–66)
SEGMENTED NEUTROPHILS ABSOLUTE COUNT: 14.3 K/UL (ref 1.8–7.7)
WBC # BLD: 19.6 K/UL (ref 3.5–11.3)
WBC # BLD: ABNORMAL 10*3/UL

## 2019-06-24 PROCEDURE — 6360000002 HC RX W HCPCS: Performed by: STUDENT IN AN ORGANIZED HEALTH CARE EDUCATION/TRAINING PROGRAM

## 2019-06-24 PROCEDURE — 36415 COLL VENOUS BLD VENIPUNCTURE: CPT

## 2019-06-24 PROCEDURE — 85025 COMPLETE CBC W/AUTO DIFF WBC: CPT

## 2019-06-24 PROCEDURE — 0UH97HZ INSERTION OF CONTRACEPTIVE DEVICE INTO UTERUS, VIA NATURAL OR ARTIFICIAL OPENING: ICD-10-PCS | Performed by: OBSTETRICS & GYNECOLOGY

## 2019-06-24 PROCEDURE — 6370000000 HC RX 637 (ALT 250 FOR IP): Performed by: STUDENT IN AN ORGANIZED HEALTH CARE EDUCATION/TRAINING PROGRAM

## 2019-06-24 PROCEDURE — 59510 CESAREAN DELIVERY: CPT | Performed by: OBSTETRICS & GYNECOLOGY

## 2019-06-24 PROCEDURE — 83735 ASSAY OF MAGNESIUM: CPT

## 2019-06-24 PROCEDURE — 1220000000 HC SEMI PRIVATE OB R&B

## 2019-06-24 PROCEDURE — 99024 POSTOP FOLLOW-UP VISIT: CPT | Performed by: OBSTETRICS & GYNECOLOGY

## 2019-06-24 RX ORDER — DOCUSATE SODIUM 100 MG/1
100 CAPSULE, LIQUID FILLED ORAL 2 TIMES DAILY
Qty: 60 CAPSULE | Refills: 0 | Status: SHIPPED | OUTPATIENT
Start: 2019-06-24

## 2019-06-24 RX ORDER — IBUPROFEN 800 MG/1
800 TABLET ORAL EVERY 8 HOURS PRN
Qty: 30 TABLET | Refills: 0 | Status: SHIPPED | OUTPATIENT
Start: 2019-06-24

## 2019-06-24 RX ORDER — NALBUPHINE HCL 10 MG/ML
5 AMPUL (ML) INJECTION ONCE
Status: COMPLETED | OUTPATIENT
Start: 2019-06-24 | End: 2019-06-24

## 2019-06-24 RX ORDER — LANOLIN ALCOHOL/MO/W.PET/CERES
325 CREAM (GRAM) TOPICAL 2 TIMES DAILY
Qty: 60 TABLET | Refills: 3 | Status: SHIPPED | OUTPATIENT
Start: 2019-06-24

## 2019-06-24 RX ORDER — OXYCODONE HYDROCHLORIDE AND ACETAMINOPHEN 5; 325 MG/1; MG/1
1 TABLET ORAL EVERY 6 HOURS PRN
Qty: 28 TABLET | Refills: 0 | Status: SHIPPED | OUTPATIENT
Start: 2019-06-24 | End: 2019-07-01

## 2019-06-24 RX ADMIN — KETOROLAC TROMETHAMINE 30 MG: 30 INJECTION, SOLUTION INTRAMUSCULAR at 14:00

## 2019-06-24 RX ADMIN — CEPHALEXIN 500 MG: 500 CAPSULE ORAL at 07:10

## 2019-06-24 RX ADMIN — Medication 500 MG: at 17:29

## 2019-06-24 RX ADMIN — OXYCODONE HYDROCHLORIDE AND ACETAMINOPHEN 1 TABLET: 5; 325 TABLET ORAL at 20:18

## 2019-06-24 RX ADMIN — KETOROLAC TROMETHAMINE 30 MG: 30 INJECTION, SOLUTION INTRAMUSCULAR at 01:28

## 2019-06-24 RX ADMIN — DOCUSATE SODIUM 100 MG: 100 CAPSULE, LIQUID FILLED ORAL at 11:30

## 2019-06-24 RX ADMIN — KETOROLAC TROMETHAMINE 30 MG: 30 INJECTION, SOLUTION INTRAMUSCULAR at 07:12

## 2019-06-24 RX ADMIN — Medication 500 MG: at 07:10

## 2019-06-24 RX ADMIN — IBUPROFEN 800 MG: 800 TABLET, FILM COATED ORAL at 20:18

## 2019-06-24 RX ADMIN — NALBUPHINE HYDROCHLORIDE 5 MG: 10 INJECTION, SOLUTION INTRAMUSCULAR; INTRAVENOUS; SUBCUTANEOUS at 01:28

## 2019-06-24 RX ADMIN — SERTRALINE 150 MG: 50 TABLET, FILM COATED ORAL at 20:18

## 2019-06-24 RX ADMIN — NALBUPHINE HYDROCHLORIDE 5 MG: 10 INJECTION, SOLUTION INTRAMUSCULAR; INTRAVENOUS; SUBCUTANEOUS at 06:22

## 2019-06-24 RX ADMIN — ENOXAPARIN SODIUM 30 MG: 30 INJECTION SUBCUTANEOUS at 07:10

## 2019-06-24 RX ADMIN — ENOXAPARIN SODIUM 30 MG: 30 INJECTION SUBCUTANEOUS at 20:17

## 2019-06-24 RX ADMIN — Medication 1 TABLET: at 11:30

## 2019-06-24 RX ADMIN — MAGNESIUM SULFATE HEPTAHYDRATE 2 G/HR: 40 INJECTION, SOLUTION INTRAVENOUS at 02:43

## 2019-06-24 RX ADMIN — MAGNESIUM SULFATE HEPTAHYDRATE 2 G/HR: 40 INJECTION, SOLUTION INTRAVENOUS at 13:48

## 2019-06-24 RX ADMIN — DOCUSATE SODIUM 100 MG: 100 CAPSULE, LIQUID FILLED ORAL at 20:17

## 2019-06-24 RX ADMIN — CEPHALEXIN 500 MG: 500 CAPSULE ORAL at 14:00

## 2019-06-24 ASSESSMENT — PAIN SCALES - GENERAL
PAINLEVEL_OUTOF10: 0
PAINLEVEL_OUTOF10: 4
PAINLEVEL_OUTOF10: 1
PAINLEVEL_OUTOF10: 0

## 2019-06-24 NOTE — PROGRESS NOTES
OB/GYN Resident Progress Note and Magnesium Sulfate Interval Note  POST OPERATIVE DAY # 1    Leandro Hernandez is a 37 y.o. female   This patient was seen and examined today. PLTCS with Mirena IUD Insertion on 6/23/19    Her pregnancy was complicated by:   Patient Active Problem List   Diagnosis    High risk pregnancy, antepartum    Abn 1hr, normal 2hr GTT    Advanced maternal age, 1st pregnancy    HSV-1 (herpes simplex virus 1) infection    TDAP received 6/13/19    Preeclampsia WITH SF    Celestone 6/21, 6/22    Placental abnormality, antepartum    High-risk pregnancy in third trimester    Positive GBS test    Pre-eclampsia, severe, antepartum, third trimester    Breech presentation    36 weeks gestation of pregnancy    PLTCS w Mirena IUD 6/23/19 F Apg 4/7, Wt 5#15       Today she is doing well without any chief complaint. Her lochia is light. She denies chest pain, shortness of breath, headache, lightheadedness, blurred vision and peripheral edema. She is breast pumping/feeding and she denies any signs or symptoms of mastitis. She is not ambulating yet, but plans to this AM. She is voiding via lord. She currently denies S/S of postpartum depression. Flatus absent. Bowel movement absent. She is tolerating solids.     Vital Signs:  Vitals:    06/24/19 0230 06/24/19 0330 06/24/19 0430 06/24/19 0530   BP: 124/79 (!) 131/91 122/84 (!) (P) 134/91   Pulse: 63 69 84 (P) 79   Resp: 18 16 18 (P) 16   Temp: 98.4 °F (36.9 °C) 98.1 °F (36.7 °C) 98.2 °F (36.8 °C) (P) 98.1 °F (36.7 °C)   TempSrc: Oral Oral Oral (P) Oral   SpO2: 95% 95% 94% (P) 96%   Weight:       Height:             Urine Input & Output last 24hrs:     Intake/Output Summary (Last 24 hours) at 6/24/2019 0533  Last data filed at 6/24/2019 0030  Gross per 24 hour   Intake 1000 ml   Output 1775 ml   Net -775 ml       Physical Exam:  General:  no apparent distress, alert and cooperative  Neurologic:  alert, oriented, normal speech, no focal findings or

## 2019-06-24 NOTE — L&D DELIVERY NOTE
Mother's Information    Labor Events     labor?:  No     Mother Delivery Information    Episiotomy:  None  Surgical or Additional Est. Blood Loss (mL):  700 (View Only):  Edit in Flowsheets   Combined Est. Blood Loss (mL):  700        Tevin Giles [[id number]]    Labor Events     labor?:  No   steroids?:  Full Course  Cervical ripening date/time:     Antibiotics received during labor?:  Yes  Rupture Identifier:  Sac 1   Rupture date/time: 19 18:54:00   Rupture type:  Artificial=AROM  Fluid color:  Meconium  Meconium consistency:  Light  Fluid odor:  None  Induction:  None  Augmentation:  None   Additional complications:   OB: DELIVERY - COMPLICATIONS   Preeclampsia         Anesthesia    Method:  Spinal     Assisted Delivery Details    Forceps attempted?:  No  Vacuum extractor attempted?:  No     Document Additional Attempt       Document Additional Attempt             Shoulder Dystocia    Shoulder dystocia present?:  No  Add Second Maneuver  Add Third Maneuver  Add Fourth Maneuver  Add Fifth Maneuver  Add Sixth Maneuver  Add Seventh Maneuver  Add Eighth Maneuver  Add Ninth Maneuver     [place] Presentation    Presentation:  Breech      Information    Head delivery date/time:  2019 18:54:00   Changing the 's delivery date/time could affect patient care.:     Delivery date/time:   19 1854   Delivery type:  , Low Transverse    Details:   Trial of labor?:  No    categorization:  Primary    priority:  Non-scheduled   Skin Incision Type:  Pfannenstiel   Uterine Incision:  Low Transverse         Delivery Providers    Delivering clinician:  Rudy Boas, DO   Provider Role    Roberta Cardozo, DO       Cord    Vessels:  3 Vessels  Complications:  None  Delayed cord clamping?:  Yes  Cord clamped date/time:  2019 194  Cord blood disposition:  Lab  Gases sent?:  Yes  Stem cell collection (by provider):   No     Placenta Date/time:  2019 18:57:00  Removal:  Manual Removal  Appearance:  Intact  Disposition:  Lab     Delivery Resuscitation    Method:  Bulb Suction, Stimulation, CPAP, PPV > 1 minute     Apgars    Living status:  Living  Apgars   1 Minute:   5 Minute:   10 Minute 15 Minute 20 Minute   Skin Color: 0  1       Heart Rate: 2  2       Reflex Irritability: 0  1       Muscle Tone: 1  2       Respiratory Effort: 1  1       Total: 4  7               Apgars Assigned By:  NICU     Skin to Skin    Skin to skin initiation date/time:     Skin to skin end date/time:     Reason skin to skin not initiated:   Acuity      Measurements       Delivery Information    Episiotomy:  None  Surgical or additional est. blood loss (mL):  700 (View Only):  Edit in Flowsheets   Combined est. blood loss (mL):  700     Other Procedures    Procedures:   Other

## 2019-06-24 NOTE — DISCHARGE SUMMARY
counseled on various alternate recommendations to decrease the exposure to secondary smoke to her children.

## 2019-06-24 NOTE — PROGRESS NOTES
Patient assessment deferred NICU just brought in infant to attempt to breastfeed. Patient heart rate is 74, respirations 20, Pulse Ox 96% all per monitor.

## 2019-06-24 NOTE — CARE COORDINATION
Social Work     Sw reviewed medical record (current active problem list) and tox screens and found no concerns. Sw met with mom briefly to explain Nicu Sw role, inquire if any needs or concerns, and provide safe sleep handout and discuss. Mom denied any needs or questions and informs baby has a safe sleep environment. Mom did inform that she and  moved here from [place] @ 2 weeks ago due to  beginning his internship at [employer]. Mom did request Sw to look into any mom's groups in the area. Mom reports this is their first child and due to move she does not know anyone in the area. Sw will look into resources and get back with mom. Sw encouraged mom to reach out if any issues or concerns arise.

## 2019-06-25 LAB — SURGICAL PATHOLOGY REPORT: NORMAL

## 2019-06-25 PROCEDURE — 2580000003 HC RX 258: Performed by: STUDENT IN AN ORGANIZED HEALTH CARE EDUCATION/TRAINING PROGRAM

## 2019-06-25 PROCEDURE — 1220000000 HC SEMI PRIVATE OB R&B

## 2019-06-25 PROCEDURE — 6370000000 HC RX 637 (ALT 250 FOR IP): Performed by: STUDENT IN AN ORGANIZED HEALTH CARE EDUCATION/TRAINING PROGRAM

## 2019-06-25 PROCEDURE — 6360000002 HC RX W HCPCS: Performed by: STUDENT IN AN ORGANIZED HEALTH CARE EDUCATION/TRAINING PROGRAM

## 2019-06-25 RX ADMIN — ENOXAPARIN SODIUM 30 MG: 30 INJECTION SUBCUTANEOUS at 21:37

## 2019-06-25 RX ADMIN — OXYCODONE HYDROCHLORIDE AND ACETAMINOPHEN 1 TABLET: 5; 325 TABLET ORAL at 17:17

## 2019-06-25 RX ADMIN — CEPHALEXIN 500 MG: 500 CAPSULE ORAL at 00:25

## 2019-06-25 RX ADMIN — Medication 500 MG: at 08:24

## 2019-06-25 RX ADMIN — Medication 1 TABLET: at 08:23

## 2019-06-25 RX ADMIN — ACETAMINOPHEN 500 MG: 500 TABLET ORAL at 17:17

## 2019-06-25 RX ADMIN — DOCUSATE SODIUM 100 MG: 100 CAPSULE, LIQUID FILLED ORAL at 08:22

## 2019-06-25 RX ADMIN — OXYCODONE HYDROCHLORIDE AND ACETAMINOPHEN 2 TABLET: 5; 325 TABLET ORAL at 00:26

## 2019-06-25 RX ADMIN — IBUPROFEN 800 MG: 800 TABLET, FILM COATED ORAL at 04:54

## 2019-06-25 RX ADMIN — OXYCODONE HYDROCHLORIDE AND ACETAMINOPHEN 1 TABLET: 5; 325 TABLET ORAL at 04:54

## 2019-06-25 RX ADMIN — Medication 10 ML: at 08:23

## 2019-06-25 RX ADMIN — Medication 500 MG: at 00:25

## 2019-06-25 RX ADMIN — IBUPROFEN 800 MG: 800 TABLET, FILM COATED ORAL at 12:47

## 2019-06-25 RX ADMIN — DOCUSATE SODIUM 100 MG: 100 CAPSULE, LIQUID FILLED ORAL at 21:36

## 2019-06-25 RX ADMIN — SERTRALINE 150 MG: 50 TABLET, FILM COATED ORAL at 21:36

## 2019-06-25 RX ADMIN — Medication 500 MG: at 17:17

## 2019-06-25 RX ADMIN — POLYETHYLENE GLYCOL 3350 17 G: 17 POWDER, FOR SOLUTION ORAL at 08:22

## 2019-06-25 RX ADMIN — IBUPROFEN 800 MG: 800 TABLET, FILM COATED ORAL at 21:35

## 2019-06-25 RX ADMIN — CEPHALEXIN 500 MG: 500 CAPSULE ORAL at 08:23

## 2019-06-25 RX ADMIN — ENOXAPARIN SODIUM 30 MG: 30 INJECTION SUBCUTANEOUS at 08:23

## 2019-06-25 RX ADMIN — CEPHALEXIN 500 MG: 500 CAPSULE ORAL at 13:49

## 2019-06-25 RX ADMIN — OXYCODONE HYDROCHLORIDE AND ACETAMINOPHEN 2 TABLET: 5; 325 TABLET ORAL at 12:46

## 2019-06-25 ASSESSMENT — PAIN DESCRIPTION - DESCRIPTORS
DESCRIPTORS: BURNING

## 2019-06-25 ASSESSMENT — PAIN SCALES - GENERAL
PAINLEVEL_OUTOF10: 5
PAINLEVEL_OUTOF10: 7
PAINLEVEL_OUTOF10: 7
PAINLEVEL_OUTOF10: 6
PAINLEVEL_OUTOF10: 2
PAINLEVEL_OUTOF10: 4
PAINLEVEL_OUTOF10: 0
PAINLEVEL_OUTOF10: 5
PAINLEVEL_OUTOF10: 7

## 2019-06-25 ASSESSMENT — PAIN DESCRIPTION - LOCATION
LOCATION: ABDOMEN
LOCATION: ABDOMEN

## 2019-06-25 ASSESSMENT — PAIN DESCRIPTION - ORIENTATION
ORIENTATION: LOWER

## 2019-06-25 ASSESSMENT — PAIN DESCRIPTION - PAIN TYPE
TYPE: SURGICAL PAIN
TYPE: ACUTE PAIN;SURGICAL PAIN
TYPE: SURGICAL PAIN

## 2019-06-25 NOTE — PROGRESS NOTES
Patient returns from the NICU, rating pain of a 7/10. Patient medicated for pain. Patient reports infant nursed 20 minutes on each side.

## 2019-06-25 NOTE — ANESTHESIA POSTPROCEDURE EVALUATION
Department of Anesthesiology  Postprocedure Note    Patient: Altagracia Goodson  MRN: [id number]  YOB: 1985  Date of evaluation: 2019  Time:  7:12 AM     Procedure Summary     Date:  19 Room / Location:  [facility] L&D OR [address] [place] [address] L&D OR    Anesthesia Start:  9399 Anesthesia Stop:      Procedure:   SECTION (N/A ) Diagnosis:  (breech, pre eclamptic with severe features)    Surgeon:  Víctor Gee DO Responsible Provider:  Rodolfo Velasco MD    Anesthesia Type:  spinal ASA Status:  3          Anesthesia Type: spinal    Delia Phase I: Delia Score: 10    Delia Phase II:      Last vitals: Reviewed and per EMR flowsheets.        Anesthesia Post Evaluation   Vital Signs (Current)   Vitals:    19 0400   BP: 132/86   Pulse: 76   Resp: 16   Temp: 97.9 °F (36.6 °C)   SpO2:      Vital Signs Statistics (for past 48 hrs)     Temp  Av.1 °F (36.7 °C)  Min: 97.7 °F (36.5 °C)   Min taken time: 19  Max: 98.6 °F (37 °C)   Max taken time: 19 0721  Pulse  Av.6  Min: 61   Min taken time: 19 0630  Max: 80   Max taken time: 19 1650  Resp  Av.4  Min: 0   Min taken time: 19 195  Max: 29   Max taken time: 19 1917  BP  Min: 70/41   Min taken time: 19 1506  Max: 152/80   Max taken time: 19 1559  SpO2  Av.5 %  Min: 94 %   Min taken time: 19 1939  Max: 100 %   Max taken time: 19 192    BP Readings from Last 3 Encounters:   19 132/86   19 121/71   19 (!) 140/84       Level of Consciousness:  Awake    Respiratory:  Stable    Airway :   Patent    Oxygen Saturation:  Stable    Cardiovascular:  Stable    Hydration:  Adequate    PONV:  Stable    Post-op Pain:  Adequate analgesia    Post-op Assessment:  No apparent anesthetic complications  Patient was evaluated post procedure  Additional Follow-Up / Treatment / Comment:  None

## 2019-06-25 NOTE — PROGRESS NOTES
POST OPERATIVE DAY # 2    Nuria Arrieta is a 37 y.o. female   This patient was seen and examined today. PLTCS on 6/23/19    Her pregnancy was complicated by:   Patient Active Problem List   Diagnosis    High risk pregnancy, antepartum    Abn 1hr, normal 2hr GTT    Advanced maternal age, 1st pregnancy    HSV-1 (herpes simplex virus 1) infection    TDAP received 6/13/19    Preeclampsia WITH SF    Celestone 6/21, 6/22    Placental abnormality, antepartum    High-risk pregnancy in third trimester    Positive GBS test    Pre-eclampsia, severe, antepartum, third trimester    Breech presentation    36 weeks gestation of pregnancy    PLTCS w Mirena IUD 6/23/19 F Apg 4/7, Wt 5#15       Today she is doing well without any chief complaint. Her lochia is light. She denies chest pain, shortness of breath, headache, lightheadedness, blurred vision and peripheral edema. She is  breast feeding and she denies any signs or symptoms of mastitis. She is ambulating well. She is voiding without difficulty. She currently denies S/S of postpartum depression. Flatus present. Bowel movement absent. She is tolerating solids.     Vital Signs:  Vitals:    06/24/19 1826 06/24/19 1957 06/25/19 0000 06/25/19 0400   BP: 117/72 119/74 132/78 132/86   Pulse: 68 75 75 76   Resp: 16 16 16 16   Temp: 98.1 °F (36.7 °C) 98.1 °F (36.7 °C) 98.2 °F (36.8 °C) 97.9 °F (36.6 °C)   TempSrc: Oral Oral Oral Oral   SpO2:  98%     Weight:       Height:           Physical Exam:  General:  no apparent distress, alert and cooperative  Neurologic:  alert, oriented, normal speech, no focal findings or movement disorder noted  Lungs:  No increased work of breathing, good air exchange, clear to auscultation bilaterally, no crackles or wheezing  Heart:  regular rate and rhythm    Abdomen: abdomen soft, non-distended, non-tender  Fundus: non-tender, firm, below umbilicus  Incision: clean, dry and intact with dermabond in place  Extremities:  no calf tenderness, non edematous    Labs:  Lab Results   Component Value Date    WBC 19.6 (H) 2019    HGB 9.8 (L) 2019    HCT 31.0 (L) 2019    MCV 93.1 2019     2019       Assessment/Plan:  1. Henry Dow is a  POD # 2 s/p PLTCS   - Doing well, VSS    - female infant in NICU   - Encourage ambulation and use of incentive spirometer   - CBC completed  2. Rh positive/Rubella immune  3. Breast feeding  4. Anemia   - Hgb 9.8 on POD#1   - Denies s/s of Anemia   - Iron on D/C   5. Obesity   - Keflex/Flagyl 500 mg TID x 48 hours   - Lovenox 30 mg BID during hospital stay  6. Pre-Eclampsia with SFs   - s/p Magnesium 24 hours PP   - Pre-E labs WNL x 1; P:C 0.94   - VSS   - Denies s/s of Pre-E  7. Hx Dysmenorrhea   - Mirena IUD placed intra-op  8. Continue post-op care. Counseling Completed:  Secondary Smoke risks and Sudden Infant Death Syndrome were reviewed with recommendations. Infant sleeping, \"back to sleep\" and avoidance of co-sleeping recommendations were reviewed. Signs and Symptoms of Post Partum Depression were reviewed. The patient is to call if any occur. Signs and symptoms of Mastitis were reviewed. The patient is to call if any occur for follow up. Discharge instructions including pelvic rest, incision care, 15 lb weight restriction, no driving with pain medicine and office follow-up were reviewed with patient     Providers Name: KATHY/LELIA Wild DO  Ob/Gyn Resident  2019, 5:40 AM      Date: 2019  Time: 9:55 AM      Patient Name: Henry Dow  Patient : 1985  Room/Bed: [facility]2/5458-18  Admission Date/Time: 2019  2:45 PM        Attending Physician Statement  I have discussed the care of Henry Dow, including pertinent history and exam findings with the resident. I have reviewed and edited their note in the electronic medical record. The key elements of all parts of the encounter have been performed/reviewed by me .   I agree with the assessment, plan and orders as documented by the resident. The level of care submitted represents to the best of my ability the care documented in the medical record today. GC Modifier. This service has been performed in part by a resident under the direction of a teaching physician. POD#2 s/p PLTCS complicated by preeclampsia with severe features. Patient is status post 24 hours of magnesium. Patient states she feels well currently and denies any complaints. Blood pressures previously normotensive however this morning have been slightly elevated. Will continue to monitor closely, no indication for antihypertensive initiation at this time.     Attending's Name:  Mike Smith DO

## 2019-06-25 NOTE — LACTATION NOTE
Mom states breast pumping is going better  redness is almost gone mom is using the 27 mm flange size. Mom states baby did put baby to breast yesterday baby may get transferred today to [facility]  discussed plan with mom  Offer breast then pump after feeding. Discussed warm salt water soaks after discharged.

## 2019-06-26 PROBLEM — O43.109 PLACENTAL ABNORMALITY, ANTEPARTUM: Status: RESOLVED | Noted: 2019-06-21 | Resolved: 2019-06-26

## 2019-06-26 PROBLEM — Z23 NEED FOR TDAP VACCINATION: Status: RESOLVED | Noted: 2019-06-13 | Resolved: 2019-06-26

## 2019-06-26 PROCEDURE — 6370000000 HC RX 637 (ALT 250 FOR IP): Performed by: STUDENT IN AN ORGANIZED HEALTH CARE EDUCATION/TRAINING PROGRAM

## 2019-06-26 PROCEDURE — 1220000000 HC SEMI PRIVATE OB R&B

## 2019-06-26 PROCEDURE — 6360000002 HC RX W HCPCS: Performed by: STUDENT IN AN ORGANIZED HEALTH CARE EDUCATION/TRAINING PROGRAM

## 2019-06-26 PROCEDURE — 2580000003 HC RX 258: Performed by: STUDENT IN AN ORGANIZED HEALTH CARE EDUCATION/TRAINING PROGRAM

## 2019-06-26 RX ORDER — NIFEDIPINE 30 MG/1
30 TABLET, EXTENDED RELEASE ORAL DAILY
Status: DISCONTINUED | OUTPATIENT
Start: 2019-06-26 | End: 2019-06-27 | Stop reason: HOSPADM

## 2019-06-26 RX ORDER — NIFEDIPINE 10 MG/1
30 CAPSULE ORAL DAILY
Status: DISCONTINUED | OUTPATIENT
Start: 2019-06-26 | End: 2019-06-26

## 2019-06-26 RX ORDER — HYDRALAZINE HYDROCHLORIDE 20 MG/ML
10 INJECTION INTRAMUSCULAR; INTRAVENOUS ONCE
Status: COMPLETED | OUTPATIENT
Start: 2019-06-26 | End: 2019-06-26

## 2019-06-26 RX ORDER — NIFEDIPINE 30 MG/1
30 TABLET, FILM COATED, EXTENDED RELEASE ORAL DAILY
Qty: 30 TABLET | Refills: 3 | Status: SHIPPED | OUTPATIENT
Start: 2019-06-27

## 2019-06-26 RX ADMIN — ACETAMINOPHEN 1000 MG: 500 TABLET ORAL at 21:38

## 2019-06-26 RX ADMIN — ACETAMINOPHEN 1000 MG: 500 TABLET ORAL at 12:54

## 2019-06-26 RX ADMIN — NIFEDIPINE 30 MG: 30 TABLET, FILM COATED, EXTENDED RELEASE ORAL at 05:50

## 2019-06-26 RX ADMIN — SERTRALINE 150 MG: 50 TABLET, FILM COATED ORAL at 08:36

## 2019-06-26 RX ADMIN — ENOXAPARIN SODIUM 30 MG: 30 INJECTION SUBCUTANEOUS at 21:38

## 2019-06-26 RX ADMIN — IBUPROFEN 800 MG: 800 TABLET, FILM COATED ORAL at 16:38

## 2019-06-26 RX ADMIN — Medication 500 MG: at 00:15

## 2019-06-26 RX ADMIN — CEPHALEXIN 500 MG: 500 CAPSULE ORAL at 00:15

## 2019-06-26 RX ADMIN — ACETAMINOPHEN 1000 MG: 500 TABLET ORAL at 06:20

## 2019-06-26 RX ADMIN — POLYETHYLENE GLYCOL 3350 17 G: 17 POWDER, FOR SOLUTION ORAL at 08:39

## 2019-06-26 RX ADMIN — IBUPROFEN 800 MG: 800 TABLET, FILM COATED ORAL at 06:21

## 2019-06-26 RX ADMIN — DOCUSATE SODIUM 100 MG: 100 CAPSULE, LIQUID FILLED ORAL at 08:36

## 2019-06-26 RX ADMIN — ENOXAPARIN SODIUM 30 MG: 30 INJECTION SUBCUTANEOUS at 06:20

## 2019-06-26 RX ADMIN — Medication 10 ML: at 08:36

## 2019-06-26 RX ADMIN — Medication 1 TABLET: at 08:36

## 2019-06-26 RX ADMIN — ACETAMINOPHEN 1000 MG: 500 TABLET ORAL at 00:15

## 2019-06-26 RX ADMIN — DOCUSATE SODIUM 100 MG: 100 CAPSULE, LIQUID FILLED ORAL at 21:38

## 2019-06-26 RX ADMIN — HYDRALAZINE HYDROCHLORIDE 10 MG: 20 INJECTION INTRAMUSCULAR; INTRAVENOUS at 05:56

## 2019-06-26 ASSESSMENT — PAIN DESCRIPTION - LOCATION: LOCATION: ABDOMEN

## 2019-06-26 ASSESSMENT — PAIN DESCRIPTION - PAIN TYPE: TYPE: SURGICAL PAIN

## 2019-06-26 ASSESSMENT — PAIN SCALES - GENERAL
PAINLEVEL_OUTOF10: 2
PAINLEVEL_OUTOF10: 3
PAINLEVEL_OUTOF10: 2
PAINLEVEL_OUTOF10: 3
PAINLEVEL_OUTOF10: 4

## 2019-06-26 ASSESSMENT — PAIN DESCRIPTION - DESCRIPTORS: DESCRIPTORS: DISCOMFORT

## 2019-06-26 NOTE — PROGRESS NOTES
Call to Dr Dennys Hernandez regarding bp 161/106. Repeat BP in 15 minutes. Patient asymptomatic at this time. Verbal order for procardia 30mg daily to start now. Will continue to monitor.

## 2019-06-27 VITALS
HEIGHT: 64 IN | BODY MASS INDEX: 39.27 KG/M2 | HEART RATE: 83 BPM | RESPIRATION RATE: 18 BRPM | TEMPERATURE: 97.8 F | DIASTOLIC BLOOD PRESSURE: 69 MMHG | OXYGEN SATURATION: 98 % | SYSTOLIC BLOOD PRESSURE: 115 MMHG | WEIGHT: 230 LBS

## 2019-06-27 PROBLEM — O09.90 HIGH RISK PREGNANCY, ANTEPARTUM: Status: RESOLVED | Noted: 2019-06-13 | Resolved: 2019-06-27

## 2019-06-27 PROBLEM — O14.13 PRE-ECLAMPSIA, SEVERE, ANTEPARTUM, THIRD TRIMESTER: Status: RESOLVED | Noted: 2019-06-23 | Resolved: 2019-06-27

## 2019-06-27 PROBLEM — O09.519 ADVANCED MATERNAL AGE, 1ST PREGNANCY: Status: RESOLVED | Noted: 2019-06-13 | Resolved: 2019-06-27

## 2019-06-27 PROBLEM — O99.810 ABNORMAL GLUCOSE AFFECTING PREGNANCY: Status: RESOLVED | Noted: 2019-06-13 | Resolved: 2019-06-27

## 2019-06-27 PROBLEM — B95.1 POSITIVE GBS TEST: Status: RESOLVED | Noted: 2019-06-23 | Resolved: 2019-06-27

## 2019-06-27 PROBLEM — Z79.899 MEDICATION DOSE CHANGED: Status: RESOLVED | Noted: 2019-06-21 | Resolved: 2019-06-27

## 2019-06-27 PROCEDURE — 6360000002 HC RX W HCPCS: Performed by: STUDENT IN AN ORGANIZED HEALTH CARE EDUCATION/TRAINING PROGRAM

## 2019-06-27 PROCEDURE — 6370000000 HC RX 637 (ALT 250 FOR IP): Performed by: STUDENT IN AN ORGANIZED HEALTH CARE EDUCATION/TRAINING PROGRAM

## 2019-06-27 RX ADMIN — IBUPROFEN 800 MG: 800 TABLET, FILM COATED ORAL at 09:00

## 2019-06-27 RX ADMIN — IBUPROFEN 800 MG: 800 TABLET, FILM COATED ORAL at 00:59

## 2019-06-27 RX ADMIN — NIFEDIPINE 30 MG: 30 TABLET, FILM COATED, EXTENDED RELEASE ORAL at 09:00

## 2019-06-27 RX ADMIN — ENOXAPARIN SODIUM 30 MG: 30 INJECTION SUBCUTANEOUS at 09:00

## 2019-06-27 RX ADMIN — SERTRALINE 150 MG: 50 TABLET, FILM COATED ORAL at 09:00

## 2019-06-27 RX ADMIN — DOCUSATE SODIUM 100 MG: 100 CAPSULE, LIQUID FILLED ORAL at 09:02

## 2019-06-27 RX ADMIN — Medication 1 TABLET: at 09:00

## 2019-06-27 RX ADMIN — ACETAMINOPHEN 1000 MG: 500 TABLET ORAL at 09:00

## 2019-06-27 ASSESSMENT — PAIN SCALES - GENERAL
PAINLEVEL_OUTOF10: 4
PAINLEVEL_OUTOF10: 4

## 2019-07-02 ENCOUNTER — ROUTINE PRENATAL (OUTPATIENT)
Dept: OBGYN CLINIC | Age: 34
End: 2019-07-02

## 2019-07-02 VITALS
DIASTOLIC BLOOD PRESSURE: 82 MMHG | BODY MASS INDEX: 36.73 KG/M2 | HEART RATE: 87 BPM | SYSTOLIC BLOOD PRESSURE: 136 MMHG | WEIGHT: 214 LBS

## 2019-07-02 DIAGNOSIS — Z01.30 BLOOD PRESSURE CHECK: ICD-10-CM

## 2019-07-02 DIAGNOSIS — Z09 POSTOP CHECK: Primary | ICD-10-CM

## 2019-07-02 PROCEDURE — 99024 POSTOP FOLLOW-UP VISIT: CPT | Performed by: ADVANCED PRACTICE MIDWIFE

## 2019-07-02 NOTE — PROGRESS NOTES
CC: Incision and BP check    HPI: Patient had Primary  d/t Preeclampsia and breech presentation on 19    States her bleeding is minimal. Denies any S/S of preeclampsia. Her friend has been staying with her while her  has started residency. Overall feeling well. Baby is doing well. Is being monitored for weight gain. She states she is nursing without difficulty. Baby is just a little sleepy. Baby is on an apnea monitor at this time. Otherwise pain is doing well. Does not feel depressed. She is happy with out things went although not according to birth plan. O: /82 (Site: Left Upper Arm, Position: Sitting, Cuff Size: Large Adult)   Pulse 87   Wt 214 lb (97.1 kg)   LMP 10/14/2018   Breastfeeding?  Yes   BMI 36.73 kg/m²   Incision healing, intact, no evidence of infection     A: Post op check  No evidence of infection  H/o of preeclampsia on nifedipine 30mg  BP Stable  No s/s of PreE  Breastfeeding    P: RTC In 2 weeks for f/u  Continue meds as prescribed  Warnings reviewed      More then 50% of 15min appt was spent counseling on the above

## 2019-07-03 ENCOUNTER — TELEPHONE (OUTPATIENT)
Dept: OBGYN CLINIC | Age: 34
End: 2019-07-03

## 2019-07-03 NOTE — TELEPHONE ENCOUNTER
----- Message from Jessica Copeland, [address] sent at 7/2/2019  9:17 PM EDT -----  Regarding: BP med  Can you call and make sure that she continues to take her BP medication. We will reevaluate at her next appt. Likely will continue until 6 weeks pp.       Thanks    Donell Gomez

## 2019-07-16 ENCOUNTER — POSTPARTUM VISIT (OUTPATIENT)
Dept: OBGYN CLINIC | Age: 34
End: 2019-07-16

## 2019-07-16 VITALS
HEIGHT: 64 IN | HEART RATE: 79 BPM | WEIGHT: 210.9 LBS | BODY MASS INDEX: 36 KG/M2 | SYSTOLIC BLOOD PRESSURE: 113 MMHG | DIASTOLIC BLOOD PRESSURE: 74 MMHG

## 2019-07-16 PROCEDURE — 0503F POSTPARTUM CARE VISIT: CPT | Performed by: ADVANCED PRACTICE MIDWIFE

## 2019-08-09 ENCOUNTER — TELEPHONE (OUTPATIENT)
Dept: OBGYN CLINIC | Age: 34
End: 2019-08-09

## 2019-08-15 ENCOUNTER — POSTPARTUM VISIT (OUTPATIENT)
Dept: OBGYN CLINIC | Age: 34
End: 2019-08-15

## 2019-08-15 VITALS
BODY MASS INDEX: 37.25 KG/M2 | DIASTOLIC BLOOD PRESSURE: 78 MMHG | HEIGHT: 64 IN | WEIGHT: 218.2 LBS | SYSTOLIC BLOOD PRESSURE: 118 MMHG

## 2019-08-15 DIAGNOSIS — Z97.5 IUD (INTRAUTERINE DEVICE) IN PLACE: ICD-10-CM

## 2019-08-15 PROBLEM — B00.9 HSV-1 (HERPES SIMPLEX VIRUS 1) INFECTION: Status: RESOLVED | Noted: 2019-06-13 | Resolved: 2019-08-15

## 2019-08-15 PROBLEM — O09.93 HIGH-RISK PREGNANCY IN THIRD TRIMESTER: Status: RESOLVED | Noted: 2019-06-23 | Resolved: 2019-08-15

## 2019-08-15 PROBLEM — O14.90 PREECLAMPSIA: Status: RESOLVED | Noted: 2019-06-21 | Resolved: 2019-08-15

## 2019-08-15 PROCEDURE — 0503F POSTPARTUM CARE VISIT: CPT | Performed by: ADVANCED PRACTICE MIDWIFE

## 2019-08-15 NOTE — PROGRESS NOTES
Chief Complaint   Patient presents with    Postpartum Care     delivered 19       HPI:  DELIVERY DATE: 19  TYPE OF DELIVERY: primary  section  PROVIDER:   PERINEUM: n/a    [facility] was seen for her 6 week visit. Her Female infant is healthy. She is breast feeding. She is breastfeeding without difficulty. She is not experiencing pain. She is rating her pain 0  She reports her lochia is no bleeding  She reports none perineal discomfort. She denies urinary incontinence. Her bowels have returned to her normal pattern. She is back to her normal activity pattern. She has not her first menses. [facility] has engaged in intercourse. [place] was protected. She does not report a mood disorder. She feels she is not having difficulty coping. [facility] feels her family adjustment is effective. She reports an overall positive birth experience. Her [place] Score is 2. This score does match my assessment. OBJECTIVE:   Wt Readings from Last 3 Encounters:   08/15/19 218 lb 3.2 oz (99 kg)   19 210 lb 14.4 oz (95.7 kg)   19 214 lb (97.1 kg)       Blood pressure 118/78, height 5' 4\" (1.626 m), weight 218 lb 3.2 oz (99 kg), last menstrual period 10/14/2018, currently breastfeeding. Breast exam: deferred    Abdomen: Soft non-tender without guarding. There is diastasis present. The diastasis is 3 finger breaths of separation. Incision healed     Perineum: IUD strings visualized from cervical os    Extremities: No calf tenderness bilaterally. No edema. ASSESSMENT/PLAN:    6 week postpartum visit  · She will return for 1 year   · Labs were not ordered. · 2018 Date of last pap:  breast feeding  · Signs & Symptoms of mastitis reviewed; notify if occurs  non-smoker:    Family planning needs  · Family planning counseling was completed.    Adverse outcomes/Arteriosclerotic Cardiovascular Disease Screen (ASCVD):  HTN disorder: (Pre-Eclampsia and/or gHTN)  : reinforced

## 2020-05-06 NOTE — PROGRESS NOTES
Refill request from patient for the medication listed below.  Patient was last seen 3/20/2020.  Next appt 5/8/2020.  OK to refill as previous?    Last written as    baclofen (LIORESAL) 10 MG tablet 90 tablet 0 4/6/2020     Sig - Route: Take 1 tablet by mouth 3 times daily. - Oral        Pharmacy:  [facility]  Call when complete?  no             Resident Interval Magnesium Sulfate Note    Leandro Hernandez is a 37 y.o. female  POD# 1 s/p PLTCS w Mirena IUD placement on 19  The patient is resting comfortably. She denies headache, visual changes and abdominal pain in the right upper quadrant. She denies any shortness of breath or chest pain. She denies change in her extremities, regarding swelling. Continuous Medications:    magnesium sulfate 2 g/hr (19 1348)    lactated ringers 125 mL/hr at 19    oxytocin 1 gustavo-units/min (19)       Vitals:    Vitals:    19 1130 19 1306 19 1400 19 1500   BP: 109/70 106/69 126/78 121/74   Pulse: 62 64 72 70   Resp: 16 16 16 18   Temp: 98 °F (36.7 °C) 97.8 °F (36.6 °C)  98.2 °F (36.8 °C)   TempSrc:    Oral   SpO2: 98% 96% 95% 96%   Weight:       Height:             Physical Exam:  Chest: clear to auscultation bilaterally  Heart: RRR no murmur  Abdomen: soft, nontender, nondistended  Extremities: DTR increased Right: 3/4   Left: 3/4  Clonus: present with 1 beat    Urine Output: 70ml/hr; Clear urine    Labs:  Last Magnesium Level:   Lab Results   Component Value Date    MG 5.8 2019       BMP:    Recent Labs     19  1541      K 4.3      CO2 19*   BUN 10   CREATININE 0.49*   GLUCOSE 142*       ASSESSMENT/PLAN  Leandro Hernandez is a 37 y.o. female  POD# 1 s/p PLTCS w Mirena IUD placement   - Continue Magnesium Sulfate Treatment 2g/hr, off @ 2000 @ 19   - Mag levels q6hrs per provider, last level 5.8 @ 1219, next @ 0619   - BPs stable   - Patient denies any s/s PreE   - UOP adequate   - Pt denies any s/s PreE   - PreE labs WNL x 1, P/C 0.94   - Will continue to monitor closely    Senior resident updated and agreeable to plan.     Alyssa Farley, DO  Ob/Gyn Resident  2019, 3:49 PM

## (undated) DEVICE — TOWEL SURG W16XL26IN WHT NONFENESTRATED ST 2 PER PK

## (undated) DEVICE — GLOVE,SURG,SENSICARE SLT,LF,PF,6.5: Brand: MEDLINE

## (undated) DEVICE — KENDALL SCD EXPRESS SLEEVES, KNEE LENGTH, MEDIUM: Brand: KENDALL SCD

## (undated) DEVICE — SUTURE PLN GUT SZ 3-0 L27IN ABSRB YELLOWISH TAN L36MM CT-1 842H

## (undated) DEVICE — GOWN,PREVENTION PLUS,XLN/XL,ST,24/CS: Brand: MEDLINE

## (undated) DEVICE — SUTURE VCRL SZ 3-0 L27IN ABSRB UD L26MM SH 1/2 CIR J416H